# Patient Record
Sex: FEMALE | Race: WHITE | NOT HISPANIC OR LATINO | Employment: FULL TIME | URBAN - METROPOLITAN AREA
[De-identification: names, ages, dates, MRNs, and addresses within clinical notes are randomized per-mention and may not be internally consistent; named-entity substitution may affect disease eponyms.]

---

## 2023-08-02 ENCOUNTER — HOSPITAL ENCOUNTER (EMERGENCY)
Facility: MEDICAL CENTER | Age: 43
End: 2023-08-02
Attending: EMERGENCY MEDICINE
Payer: COMMERCIAL

## 2023-08-02 ENCOUNTER — APPOINTMENT (OUTPATIENT)
Dept: RADIOLOGY | Facility: MEDICAL CENTER | Age: 43
End: 2023-08-02
Attending: EMERGENCY MEDICINE
Payer: COMMERCIAL

## 2023-08-02 VITALS
HEIGHT: 64 IN | SYSTOLIC BLOOD PRESSURE: 143 MMHG | DIASTOLIC BLOOD PRESSURE: 89 MMHG | WEIGHT: 177.25 LBS | BODY MASS INDEX: 30.26 KG/M2 | HEART RATE: 95 BPM | TEMPERATURE: 97.6 F | OXYGEN SATURATION: 99 % | RESPIRATION RATE: 18 BRPM

## 2023-08-02 DIAGNOSIS — M25.522 LEFT ELBOW PAIN: ICD-10-CM

## 2023-08-02 PROCEDURE — 700111 HCHG RX REV CODE 636 W/ 250 OVERRIDE (IP): Performed by: EMERGENCY MEDICINE

## 2023-08-02 PROCEDURE — 700102 HCHG RX REV CODE 250 W/ 637 OVERRIDE(OP): Performed by: EMERGENCY MEDICINE

## 2023-08-02 PROCEDURE — 96372 THER/PROPH/DIAG INJ SC/IM: CPT | Mod: EDC

## 2023-08-02 PROCEDURE — 73080 X-RAY EXAM OF ELBOW: CPT | Mod: LT

## 2023-08-02 PROCEDURE — A9270 NON-COVERED ITEM OR SERVICE: HCPCS | Performed by: EMERGENCY MEDICINE

## 2023-08-02 PROCEDURE — 36415 COLL VENOUS BLD VENIPUNCTURE: CPT | Mod: EDC

## 2023-08-02 PROCEDURE — 99284 EMERGENCY DEPT VISIT MOD MDM: CPT | Mod: EDC

## 2023-08-02 RX ORDER — IBUPROFEN 400 MG/1
400 TABLET ORAL EVERY 6 HOURS PRN
COMMUNITY

## 2023-08-02 RX ORDER — HYDROMORPHONE HYDROCHLORIDE 1 MG/ML
0.5 INJECTION, SOLUTION INTRAMUSCULAR; INTRAVENOUS; SUBCUTANEOUS ONCE
Status: COMPLETED | OUTPATIENT
Start: 2023-08-02 | End: 2023-08-02

## 2023-08-02 RX ORDER — OXYCODONE AND ACETAMINOPHEN 10; 325 MG/1; MG/1
1 TABLET ORAL ONCE
Status: COMPLETED | OUTPATIENT
Start: 2023-08-02 | End: 2023-08-02

## 2023-08-02 RX ORDER — KETOROLAC TROMETHAMINE 30 MG/ML
15 INJECTION, SOLUTION INTRAMUSCULAR; INTRAVENOUS ONCE
Status: COMPLETED | OUTPATIENT
Start: 2023-08-02 | End: 2023-08-02

## 2023-08-02 RX ORDER — OXYCODONE HYDROCHLORIDE AND ACETAMINOPHEN 5; 325 MG/1; MG/1
1 TABLET ORAL EVERY 6 HOURS PRN
Qty: 12 TABLET | Refills: 0 | Status: SHIPPED | OUTPATIENT
Start: 2023-08-02 | End: 2023-08-05

## 2023-08-02 RX ORDER — OXYCODONE HYDROCHLORIDE AND ACETAMINOPHEN 5; 325 MG/1; MG/1
1 TABLET ORAL EVERY 6 HOURS PRN
Qty: 12 TABLET | Refills: 0 | Status: SHIPPED | OUTPATIENT
Start: 2023-08-02 | End: 2023-08-02 | Stop reason: SDUPTHER

## 2023-08-02 RX ORDER — ACETAMINOPHEN 500 MG
500-1000 TABLET ORAL EVERY 6 HOURS PRN
COMMUNITY

## 2023-08-02 RX ADMIN — OXYCODONE AND ACETAMINOPHEN 1 TABLET: 10; 325 TABLET ORAL at 07:58

## 2023-08-02 RX ADMIN — KETOROLAC TROMETHAMINE 15 MG: 30 INJECTION, SOLUTION INTRAMUSCULAR; INTRAVENOUS at 09:03

## 2023-08-02 RX ADMIN — HYDROMORPHONE HYDROCHLORIDE 0.5 MG: 1 INJECTION, SOLUTION INTRAMUSCULAR; INTRAVENOUS; SUBCUTANEOUS at 09:06

## 2023-08-02 NOTE — ED NOTES
"Vital signs reassessed. Pt resting on gurney, repeatedly saying \"ow,ow, ow\". Pt medicated per MAR.    Family verbalizes understanding of plan of care. No needs at this time. Call light within reach.    in place.   "

## 2023-08-02 NOTE — DISCHARGE INSTRUCTIONS
You were seen in the ER for elbow pain.  This may be due to multiple causes as we discussed in the ER but thankfully your x-ray did not show a fracture.  We placed you in a sling to help with the discomfort and I have called in a prescription for some pain medication on your behalf, take it only as directed.  Do not drink alcohol or drive after taking the medication as it can make you sleepy.  I recommend following up at the orthopedic surgery urgent care.  You can look up online [facility] they have multiple clinics in the area.  I recommend using ibuprofen and ice and/or heat as are helpful.  Follow-up with your primary care physician next week when you return home to [place].  Return to the ER with new or worsening symptoms.

## 2023-08-02 NOTE — ED NOTES
Olivia Simons discharged. Discharge instructions including signs and symptoms to monitor for, hydration importance, hand hygiene, monitoring for worsening symptoms, monitoring pain importance, provided to pt. pt educated to return to the ER for any concerns or worsening symptoms. pt verbalizes understanding with no further questions or concerns.     pt verbalizes understanding of importance of follow up with [facility].     Prescriptions for percocet sent to pts preferred pharmacy. verbalize understanding of need to  prescription. Medication administration reviewed with family. Pt consent for narcotics signed.     Copy of discharge instructions provided to patient.  Signed copy in chart. Family aware of use of mychart for test results.     Patient ambulated out of department with family. Patient refused wheelchair. Patient is in no apparent distress, awake, alert, interactive on discharge.

## 2023-08-02 NOTE — ED NOTES
Allergies verified. Pt states she has had toradol before without issues. States allergy to pepto and was advised to avoid aspirin. Spoke with LB, pharmacy regarding dosing.

## 2023-08-02 NOTE — ED NOTES
Pt medicated per MAR. Ice pack given for comfort.   Patient verbalizes understanding of driving restrictions.   No additional needs, call light in reach.

## 2023-08-02 NOTE — ED NOTES
Pt ambulated to Peds [facility]. family at bedside. Assessment completed. Agree with triage RN note. Pt awake, alert, pink, interactive, and in no apparent distress. Pt reports numbness/pain extending from L elbow to fingertips. CMS intact.  instructed to change into gown. No needs at this time. verbalizes understanding of NPO status. Call light within reach.     ERP at bedside.

## 2023-08-02 NOTE — ED TRIAGE NOTES
Olivia Kristyn  43 y.o. female  Chief Complaint   Patient presents with    Elbow Pain     Left sided elbow pain. Pt states she believes she strained the elbow trying to retrieve her phone from in between her car seat and the car door yesterday evening @ 1700. Pt having numbness and pain to the elbow with reduced mobility.        Vitals:    08/02/23 0733   BP: (!) 146/81   Pulse: 95   Resp: 16   Temp: 36.8 °C (98.3 °F)   SpO2: 100%       Patient educated on triage process and encouraged to alert staff of any changes in condition.    Pt to triage with the above complaint. CNS intact.

## 2023-08-02 NOTE — ED PROVIDER NOTES
ED Provider Note    CHIEF COMPLAINT  Chief Complaint   Patient presents with    Elbow Pain     Left sided elbow pain. Pt states she believes she strained the elbow trying to retrieve her phone from in between her car seat and the car door yesterday evening @ 1700. Pt having numbness and pain to the elbow with reduced mobility.        EXTERNAL RECORDS REVIEWED  Other none available    HPI/ROS  LIMITATION TO HISTORY   Select: : None  OUTSIDE HISTORIAN(S):  None    Olivia Simons is a 43 y.o. female who presents with a chief complaint of left elbow pain and decreased sensation in the left hand that started yesterday after she got her left arm caught between her car seat and the door.  She notes that she was in her baseline state of health until she accidentally dropped her phone down in the area between her door and seat.  She put her hand down in order to try to retrieve the object and her arm got stuck.  She began to pull and tug to get her arm out and felt a snap at the elbow.  Since then she has had severe pain and some decrease sensation in the left hand.  She notes she is unable to hold anything in the left hand but is still able to bend her elbow.  She has taken 1000 mg of Tylenol and 800 mg of ibuprofen without any improvement.  She just arrived here 1 hour ago from [place] and is visiting a friend in the local area until Sunday.  She has had no fevers.  Denies any IV drug use.    PAST MEDICAL HISTORY       SURGICAL HISTORY  patient denies any surgical history    FAMILY HISTORY  History reviewed. No pertinent family history.    SOCIAL HISTORY  Social History     Tobacco Use    Smoking status: Never    Smokeless tobacco: Never   Vaping Use    Vaping Use: Never used   Substance and Sexual Activity    Alcohol use: Yes     Comment: occasionally    Drug use: Never    Sexual activity: Not on file       CURRENT MEDICATIONS  Home Medications    **Home medications have not yet been reviewed for this encounter**    "      ALLERGIES  Allergies   Allergen Reactions    Aspirin     Pepto-Bismol [Bismuth Subsalicylate]        PHYSICAL EXAM  VITAL SIGNS: BP (!) 146/81   Pulse 95   Temp 36.8 °C (98.3 °F) (Temporal)   Resp 16   Ht 1.626 m (5' 4\")   Wt 80.4 kg (177 lb 4 oz)   SpO2 100%   BMI 30.42 kg/m²    Physical Exam  Vitals and nursing note reviewed.   Constitutional:       Appearance: Normal appearance.   HENT:      Head: Normocephalic and atraumatic.      Right Ear: External ear normal.      Left Ear: External ear normal.      Nose: Nose normal.      Mouth/Throat:      Mouth: Mucous membranes are moist.      Pharynx: Oropharynx is clear.   Eyes:      Extraocular Movements: Extraocular movements intact.      Conjunctiva/sclera: Conjunctivae normal.      Pupils: Pupils are equal, round, and reactive to light.   Cardiovascular:      Rate and Rhythm: Normal rate.      Comments: 2+ left radial pulse.  All digits on the left hand are well-perfused with less than 2-second capillary refill.  Pulmonary:      Effort: Pulmonary effort is normal.   Musculoskeletal:      Cervical back: Normal range of motion and neck supple.      Comments: Full range of motion at left wrist and left elbow.  Wiggles all digits on the left hand.  No significant bony tenderness in the left upper extremity.  No deformity.  No swelling.  Erythema.  No fluctuance.  No crepitus.   Skin:     General: Skin is warm and dry.   Neurological:      Mental Status: She is alert.      Comments: Mildly decreased sensation in all digits of the left hand.   Psychiatric:         Behavior: Behavior normal.      Comments: Tearful but pleasant and cooperative.       DIAGNOSTIC STUDIES / PROCEDURES  RADIOLOGY  I have independently interpreted the diagnostic imaging associated with this visit and am waiting the final reading from the radiologist.   My preliminary interpretation is as follows: No dislocation or subluxation    Radiologist interpretation:   DX-ELBOW-COMPLETE 3+ " LEFT   Final Result      Unremarkable LEFT elbow.        COURSE & MEDICAL DECISION MAKING    ED Observation Status? No; Patient does not meet criteria for ED Observation.     INITIAL ASSESSMENT, COURSE AND PLAN  Care Narrative: This is a 43-year-old female who is here with left elbow pain after getting it stuck between her 's seat and car door and tugging on the arm.  There is some mild decrease sensation in the left hand but otherwise she is neurovascularly intact in that left upper extremity.  She has no bony tenderness or obvious deformity.  She is able to range both the left elbow and left wrist.  Differential diagnosis includes, but is not limited to, neuropraxia, sprain, contusion, dislocation, fracture.    Patient was given a dose of oral pain medication and x-rays were obtained.    Thankfully x-ray of the left elbow did not reveal any traumatic injury.  Patient was reevaluated at bedside.  She is very tearful and notes that the oxycodone has done nothing to improve her pain.  We discussed possible anterior interosseous nerve damage and possible dose of gabapentin but she notes that she takes this already as well as duloxetine for fibromyalgia.  She notes that her fibromyalgia has been well controlled since the initiation of this medication.  We ultimately decided on some intramuscular pain medication as well as a sling.  She was observed after the IM pain medication with significant improvement.  She was given a small prescription for pain medication as well as directions to use rest, ice/heat, elevation, and NSAIDs.  Sling actually made her arm pain worse so we discontinued this in the ED.  She was ultimately discharged in good and stable condition with strict return precautions.  Gave her the contact information for local orthopedic surgery as well.    HTN/IDDM FOLLOW UP:  The patient is referred to a primary physician for blood pressure management, diabetic screening, and for all other preventive  health concerns    ADDITIONAL PROBLEM LIST  None  DISPOSITION AND DISCUSSIONS  I have discussed management of the patient with the following physicians and MARKOS's: None    Discussion of management with other QHP or appropriate source(s): None     Escalation of care considered, and ultimately not performed:IV fluids and blood analysis    Barriers to care at this time, including but not limited to: Patient does not have established PCP.     Decision tools and prescription drugs considered including, but not limited to: Pain Medications -Percocet .    FINAL DIAGNOSIS  1. Left elbow pain      Electronically signed by: Tim Hicks M.D., 8/2/2023 7:47 AM

## 2023-08-03 NOTE — ED PROVIDER NOTES
ED Provider Note    I was asked by case management to send a prescription for Percocet to a different pharmacy.  As a courtesy to the patient, case management and this patient I have done so.    PDMP is reviewed.  I resent it to their pharmacy.  Do not see this patient this is a courtesy.      In prescribing controlled substances to this patient, I certify that I have obtained and reviewed the medical history of Olivia Simons. I have also made a good bentley effort to obtain applicable records from other providers who have treated the patient and records did not demonstrate any increased risk of substance abuse that would prevent me from prescribing controlled substances.     I have conducted a physical exam and documented it. I have reviewed Ms. Simons’s prescription history as maintained by the [place] Prescription Monitoring Program.     I have assessed the patient’s risk for abuse, dependency, and addiction using the validated Opioid Risk Tool available at https://www.mdcalc.com/rotpxi-wtkk-ltkx-ort-narcotic-abuse.     Given the above, I believe the benefits of controlled substance therapy outweigh the risks. The reasons for prescribing controlled substances include non-narcotic, oral analgesic alternatives have been inadequate for pain control. Accordingly, I have discussed the risk and benefits, treatment plan, and alternative therapies with the patient.            Electronically signed by: Lenin Cook M.D., 8/2/2023 5:12 PM